# Patient Record
Sex: FEMALE | Race: WHITE | NOT HISPANIC OR LATINO | URBAN - METROPOLITAN AREA
[De-identification: names, ages, dates, MRNs, and addresses within clinical notes are randomized per-mention and may not be internally consistent; named-entity substitution may affect disease eponyms.]

---

## 2017-06-28 ENCOUNTER — EMERGENCY (EMERGENCY)
Facility: HOSPITAL | Age: 32
LOS: 1 days | Discharge: HOME | End: 2017-06-28
Attending: EMERGENCY MEDICINE
Payer: COMMERCIAL

## 2017-06-28 VITALS
DIASTOLIC BLOOD PRESSURE: 69 MMHG | RESPIRATION RATE: 20 BRPM | SYSTOLIC BLOOD PRESSURE: 102 MMHG | HEART RATE: 74 BPM | TEMPERATURE: 98 F | OXYGEN SATURATION: 99 %

## 2017-06-28 VITALS
SYSTOLIC BLOOD PRESSURE: 106 MMHG | HEART RATE: 69 BPM | TEMPERATURE: 98 F | OXYGEN SATURATION: 100 % | DIASTOLIC BLOOD PRESSURE: 71 MMHG | RESPIRATION RATE: 18 BRPM

## 2017-06-28 DIAGNOSIS — T42.4X2A POISONING BY BENZODIAZEPINES, INTENTIONAL SELF-HARM, INITIAL ENCOUNTER: ICD-10-CM

## 2017-06-28 DIAGNOSIS — F10.120 ALCOHOL ABUSE WITH INTOXICATION, UNCOMPLICATED: ICD-10-CM
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
## 2017-06-28 DIAGNOSIS — Z88.1 ALLERGY STATUS TO OTHER ANTIBIOTIC AGENTS STATUS: ICD-10-CM

## 2017-06-28 LAB
ALBUMIN SERPL ELPH-MCNC: 4 G/DL — SIGNIFICANT CHANGE UP (ref 3.5–5)
ALP SERPL-CCNC: 33 U/L — LOW (ref 40–120)
ALT FLD-CCNC: 16 U/L DA — SIGNIFICANT CHANGE UP (ref 10–60)
ANION GAP SERPL CALC-SCNC: 8 MMOL/L — SIGNIFICANT CHANGE UP (ref 5–17)
APAP SERPL-MCNC: <2 UG/ML — LOW (ref 10–30)
AST SERPL-CCNC: 16 U/L — SIGNIFICANT CHANGE UP (ref 10–40)
BASOPHILS # BLD AUTO: 0.1 K/UL — SIGNIFICANT CHANGE UP (ref 0–0.2)
BASOPHILS NFR BLD AUTO: 1.3 % — SIGNIFICANT CHANGE UP (ref 0–2)
BILIRUB SERPL-MCNC: 0.5 MG/DL — SIGNIFICANT CHANGE UP (ref 0.2–1.2)
BUN SERPL-MCNC: 8 MG/DL — SIGNIFICANT CHANGE UP (ref 7–18)
CALCIUM SERPL-MCNC: 8.5 MG/DL — SIGNIFICANT CHANGE UP (ref 8.4–10.5)
CHLORIDE SERPL-SCNC: 108 MMOL/L — SIGNIFICANT CHANGE UP (ref 96–108)
CO2 SERPL-SCNC: 26 MMOL/L — SIGNIFICANT CHANGE UP (ref 22–31)
CREAT SERPL-MCNC: 0.6 MG/DL — SIGNIFICANT CHANGE UP (ref 0.5–1.3)
EOSINOPHIL # BLD AUTO: 0.2 K/UL — SIGNIFICANT CHANGE UP (ref 0–0.5)
EOSINOPHIL NFR BLD AUTO: 4.7 % — SIGNIFICANT CHANGE UP (ref 0–6)
ETHANOL SERPL-MCNC: 139 MG/DL — HIGH (ref 0–10)
GLUCOSE SERPL-MCNC: 88 MG/DL — SIGNIFICANT CHANGE UP (ref 70–99)
HCG SERPL-ACNC: <1 MIU/ML — SIGNIFICANT CHANGE UP
HCT VFR BLD CALC: 36.6 % — SIGNIFICANT CHANGE UP (ref 34.5–45)
HGB BLD-MCNC: 11.9 G/DL — SIGNIFICANT CHANGE UP (ref 11.5–15.5)
LYMPHOCYTES # BLD AUTO: 2.3 K/UL — SIGNIFICANT CHANGE UP (ref 1–3.3)
LYMPHOCYTES # BLD AUTO: 43.6 % — SIGNIFICANT CHANGE UP (ref 13–44)
MCHC RBC-ENTMCNC: 29.5 PG — SIGNIFICANT CHANGE UP (ref 27–34)
MCHC RBC-ENTMCNC: 32.6 GM/DL — SIGNIFICANT CHANGE UP (ref 32–36)
MCV RBC AUTO: 90.5 FL — SIGNIFICANT CHANGE UP (ref 80–100)
MONOCYTES # BLD AUTO: 0.5 K/UL — SIGNIFICANT CHANGE UP (ref 0–0.9)
MONOCYTES NFR BLD AUTO: 9 % — SIGNIFICANT CHANGE UP (ref 2–14)
NEUTROPHILS # BLD AUTO: 2.1 K/UL — SIGNIFICANT CHANGE UP (ref 1.8–7.4)
NEUTROPHILS NFR BLD AUTO: 41.3 % — LOW (ref 43–77)
PLATELET # BLD AUTO: 228 K/UL — SIGNIFICANT CHANGE UP (ref 150–400)
POTASSIUM SERPL-MCNC: 3.4 MMOL/L — LOW (ref 3.5–5.3)
POTASSIUM SERPL-SCNC: 3.4 MMOL/L — LOW (ref 3.5–5.3)
PROT SERPL-MCNC: 7.8 G/DL — SIGNIFICANT CHANGE UP (ref 6–8.3)
RBC # BLD: 4.05 M/UL — SIGNIFICANT CHANGE UP (ref 3.8–5.2)
RBC # FLD: 13.7 % — SIGNIFICANT CHANGE UP (ref 10.3–14.5)
SALICYLATES SERPL-MCNC: <1.7 MG/DL — LOW (ref 2.8–20)
SODIUM SERPL-SCNC: 142 MMOL/L — SIGNIFICANT CHANGE UP (ref 135–145)
WBC # BLD: 5.2 K/UL — SIGNIFICANT CHANGE UP (ref 3.8–10.5)
WBC # FLD AUTO: 5.2 K/UL — SIGNIFICANT CHANGE UP (ref 3.8–10.5)

## 2017-06-28 PROCEDURE — 99284 EMERGENCY DEPT VISIT MOD MDM: CPT | Mod: 25

## 2017-06-28 PROCEDURE — 36416 COLLJ CAPILLARY BLOOD SPEC: CPT

## 2017-06-28 PROCEDURE — 85027 COMPLETE CBC AUTOMATED: CPT

## 2017-06-28 PROCEDURE — 96374 THER/PROPH/DIAG INJ IV PUSH: CPT

## 2017-06-28 PROCEDURE — 93005 ELECTROCARDIOGRAM TRACING: CPT

## 2017-06-28 PROCEDURE — 80053 COMPREHEN METABOLIC PANEL: CPT

## 2017-06-28 PROCEDURE — 84702 CHORIONIC GONADOTROPIN TEST: CPT

## 2017-06-28 PROCEDURE — 99285 EMERGENCY DEPT VISIT HI MDM: CPT | Mod: 25

## 2017-06-28 PROCEDURE — 80307 DRUG TEST PRSMV CHEM ANLYZR: CPT

## 2017-06-28 RX ORDER — DEXTROSE 50 % IN WATER 50 %
50 SYRINGE (ML) INTRAVENOUS ONCE
Qty: 0 | Refills: 0 | Status: COMPLETED | OUTPATIENT
Start: 2017-06-28 | End: 2017-06-28

## 2017-06-28 RX ORDER — SODIUM CHLORIDE 9 MG/ML
1000 INJECTION INTRAMUSCULAR; INTRAVENOUS; SUBCUTANEOUS ONCE
Qty: 0 | Refills: 0 | Status: COMPLETED | OUTPATIENT
Start: 2017-06-28 | End: 2017-06-28

## 2017-06-28 RX ADMIN — SODIUM CHLORIDE 1000 MILLILITER(S): 9 INJECTION INTRAMUSCULAR; INTRAVENOUS; SUBCUTANEOUS at 04:19

## 2017-06-28 RX ADMIN — Medication 50 MILLILITER(S): at 05:37

## 2017-06-28 NOTE — ED ADULT NURSE NOTE - CAS EDN DISCHARGE ASSESSMENT
Awake/Alert and oriented to person, place and time/Symptoms improved/No adverse reaction to first time med in ED

## 2017-06-28 NOTE — ED PROVIDER NOTE - MEDICAL DECISION MAKING DETAILS
33yo F w depression, in the ER after drinking alcohol and taking 10-15 klonipins (0.5mg). VS, exam wnl, EKG w sinus mer. Will get Psych clearance/consult and Tox

## 2017-06-28 NOTE — ED PROVIDER NOTE - PROGRESS NOTE DETAILS
D/w tox, Dr Taylor, recommend 6 hrs observation, close monitoring. If AAOx3, will be medically cleared for disposition. Will contact Psych Spoke w tele Psych, deferred evaluation till 9am given benzo and EtOH ingestion. Will call back reexamined, feels better, eating crackers. Will wait Psych consult at 9am Pt signed out to me by Dr. Cardoso as klonopin OD for unknown reasons. Initially difficult to evaluate. She is now A,A,Ox3, ate breakfast, and requesting d/c home. She denies severe depression or suicidal ideation at any point recently. She reports drinking too much EtOH last night a/w her birthday, then taking multiple doses of klonopin in attempts to sleep, not realizing she was taking too much because she was drunk. She contracts for safety, has good f/u with psych this week. D/w her dad who agrees she has been acting normally, no signs/sxs of severe depression/suicidality recently. Both feel comfortable going home and request immediate d/c. I offered psychiatric consultation here, but she refused, preferring her usual psychiatrist this week. Will cancel prior psych consult placed while patient was inebriated.

## 2017-06-28 NOTE — ED PROVIDER NOTE - OBJECTIVE STATEMENT
31yo F w hx of depression, evaluated by a psychologist, on Prozac and Klonipin, in the ER after drinking alcohol last night and taking 10-15 o.5 mg Klonipin pills in a suicidal attempt. Denies hallucinations and drug use. States she was admitted to a psych [facility] 2 years ago.

## 2017-06-28 NOTE — ED ADULT NURSE NOTE - OBJECTIVE STATEMENT
pt BIBA for overdose on pills and etoh intox pt place in yellow gown,red socks and  will be offered by time for d/c. PT admits to wanting to die and having swings of mood. pt place in yellow gown,red socks with roam alert and  will be offered by time for d/c

## 2017-06-28 NOTE — ED PROVIDER NOTE - CARE PLAN
Principal Discharge DX:	Alcoholic intoxication without complication  Secondary Diagnosis:	Suicide attempt

## 2017-09-19 ENCOUNTER — EMERGENCY (EMERGENCY)
Facility: HOSPITAL | Age: 32
LOS: 1 days | Discharge: ANOTHER HOSPITAL | End: 2017-09-19
Attending: EMERGENCY MEDICINE
Payer: COMMERCIAL

## 2017-09-19 ENCOUNTER — INPATIENT (INPATIENT)
Facility: HOSPITAL | Age: 32
LOS: 4 days | Discharge: HOME | DRG: 885 | End: 2017-09-24
Attending: PSYCHIATRY & NEUROLOGY | Admitting: PSYCHIATRY & NEUROLOGY
Payer: COMMERCIAL

## 2017-09-19 VITALS
RESPIRATION RATE: 18 BRPM | TEMPERATURE: 100 F | SYSTOLIC BLOOD PRESSURE: 106 MMHG | HEART RATE: 66 BPM | DIASTOLIC BLOOD PRESSURE: 55 MMHG

## 2017-09-19 VITALS
RESPIRATION RATE: 16 BRPM | TEMPERATURE: 98 F | WEIGHT: 160.06 LBS | SYSTOLIC BLOOD PRESSURE: 100 MMHG | HEIGHT: 66 IN | HEART RATE: 62 BPM | OXYGEN SATURATION: 100 % | DIASTOLIC BLOOD PRESSURE: 65 MMHG

## 2017-09-19 DIAGNOSIS — S51.811A LACERATION WITHOUT FOREIGN BODY OF RIGHT FOREARM, INITIAL ENCOUNTER: ICD-10-CM
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
## 2017-09-19 DIAGNOSIS — F33.0 MAJOR DEPRESSIVE DISORDER, RECURRENT, MILD: ICD-10-CM

## 2017-09-19 DIAGNOSIS — Z88.1 ALLERGY STATUS TO OTHER ANTIBIOTIC AGENTS STATUS: ICD-10-CM

## 2017-09-19 DIAGNOSIS — F31.30 BIPOLAR DISORDER, CURRENT EPISODE DEPRESSED, MILD OR MODERATE SEVERITY, UNSPECIFIED: ICD-10-CM

## 2017-09-19 DIAGNOSIS — S51.812A LACERATION WITHOUT FOREIGN BODY OF LEFT FOREARM, INITIAL ENCOUNTER: ICD-10-CM

## 2017-09-19 DIAGNOSIS — F10.10 ALCOHOL ABUSE, UNCOMPLICATED: ICD-10-CM
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
## 2017-09-19 LAB
ALBUMIN SERPL ELPH-MCNC: 3.6 G/DL — SIGNIFICANT CHANGE UP (ref 3.5–5)
ALP SERPL-CCNC: 28 U/L — LOW (ref 40–120)
ALT FLD-CCNC: 47 U/L DA — SIGNIFICANT CHANGE UP (ref 10–60)
ANION GAP SERPL CALC-SCNC: 3 MMOL/L — LOW (ref 5–17)
ANION GAP SERPL CALC-SCNC: 7 MMOL/L — SIGNIFICANT CHANGE UP (ref 5–17)
APAP SERPL-MCNC: <2 UG/ML — LOW (ref 10–30)
APPEARANCE UR: CLEAR — SIGNIFICANT CHANGE UP
APTT BLD: 27.1 SEC — LOW (ref 27.5–37.4)
AST SERPL-CCNC: 41 U/L — HIGH (ref 10–40)
BILIRUB SERPL-MCNC: 0.7 MG/DL — SIGNIFICANT CHANGE UP (ref 0.2–1.2)
BILIRUB UR-MCNC: NEGATIVE — SIGNIFICANT CHANGE UP
BUN SERPL-MCNC: 10 MG/DL — SIGNIFICANT CHANGE UP (ref 7–18)
BUN SERPL-MCNC: 10 MG/DL — SIGNIFICANT CHANGE UP (ref 7–18)
CALCIUM SERPL-MCNC: 7.6 MG/DL — LOW (ref 8.4–10.5)
CALCIUM SERPL-MCNC: 8.7 MG/DL — SIGNIFICANT CHANGE UP (ref 8.4–10.5)
CHLORIDE SERPL-SCNC: 109 MMOL/L — HIGH (ref 96–108)
CHLORIDE SERPL-SCNC: 112 MMOL/L — HIGH (ref 96–108)
CO2 SERPL-SCNC: 27 MMOL/L — SIGNIFICANT CHANGE UP (ref 22–31)
CO2 SERPL-SCNC: 27 MMOL/L — SIGNIFICANT CHANGE UP (ref 22–31)
COLOR SPEC: YELLOW — SIGNIFICANT CHANGE UP
CREAT SERPL-MCNC: 0.7 MG/DL — SIGNIFICANT CHANGE UP (ref 0.5–1.3)
CREAT SERPL-MCNC: 0.72 MG/DL — SIGNIFICANT CHANGE UP (ref 0.5–1.3)
DIFF PNL FLD: NEGATIVE — SIGNIFICANT CHANGE UP
ETHANOL SERPL-MCNC: 41 MG/DL — HIGH (ref 0–10)
GLUCOSE SERPL-MCNC: 64 MG/DL — LOW (ref 70–99)
GLUCOSE SERPL-MCNC: 75 MG/DL — SIGNIFICANT CHANGE UP (ref 70–99)
GLUCOSE UR QL: NEGATIVE — SIGNIFICANT CHANGE UP
HCG SERPL-ACNC: <1 MIU/ML — SIGNIFICANT CHANGE UP
HCT VFR BLD CALC: 33.9 % — LOW (ref 34.5–45)
HGB BLD-MCNC: 10.8 G/DL — LOW (ref 11.5–15.5)
INR BLD: 1.03 RATIO — SIGNIFICANT CHANGE UP (ref 0.88–1.16)
KETONES UR-MCNC: NEGATIVE — SIGNIFICANT CHANGE UP
LEUKOCYTE ESTERASE UR-ACNC: NEGATIVE — SIGNIFICANT CHANGE UP
MCHC RBC-ENTMCNC: 29 PG — SIGNIFICANT CHANGE UP (ref 27–34)
MCHC RBC-ENTMCNC: 31.9 GM/DL — LOW (ref 32–36)
MCV RBC AUTO: 90.9 FL — SIGNIFICANT CHANGE UP (ref 80–100)
NITRITE UR-MCNC: NEGATIVE — SIGNIFICANT CHANGE UP
PCP SPEC-MCNC: SIGNIFICANT CHANGE UP
PH UR: 6 — SIGNIFICANT CHANGE UP (ref 5–8)
PLATELET # BLD AUTO: 229 K/UL — SIGNIFICANT CHANGE UP (ref 150–400)
POTASSIUM SERPL-MCNC: 2.9 MMOL/L — CRITICAL LOW (ref 3.5–5.3)
POTASSIUM SERPL-MCNC: 4 MMOL/L — SIGNIFICANT CHANGE UP (ref 3.5–5.3)
POTASSIUM SERPL-SCNC: 2.9 MMOL/L — CRITICAL LOW (ref 3.5–5.3)
POTASSIUM SERPL-SCNC: 4 MMOL/L — SIGNIFICANT CHANGE UP (ref 3.5–5.3)
PROT SERPL-MCNC: 7.3 G/DL — SIGNIFICANT CHANGE UP (ref 6–8.3)
PROT UR-MCNC: NEGATIVE — SIGNIFICANT CHANGE UP
PROTHROM AB SERPL-ACNC: 11.2 SEC — SIGNIFICANT CHANGE UP (ref 9.8–12.7)
RBC # BLD: 3.73 M/UL — LOW (ref 3.8–5.2)
RBC # FLD: 13.2 % — SIGNIFICANT CHANGE UP (ref 10.3–14.5)
SALICYLATES SERPL-MCNC: <1.7 MG/DL — LOW (ref 2.8–20)
SODIUM SERPL-SCNC: 142 MMOL/L — SIGNIFICANT CHANGE UP (ref 135–145)
SODIUM SERPL-SCNC: 143 MMOL/L — SIGNIFICANT CHANGE UP (ref 135–145)
SP GR SPEC: 1.01 — SIGNIFICANT CHANGE UP (ref 1.01–1.02)
UROBILINOGEN FLD QL: NEGATIVE — SIGNIFICANT CHANGE UP
WBC # BLD: 4.2 K/UL — SIGNIFICANT CHANGE UP (ref 3.8–10.5)
WBC # FLD AUTO: 4.2 K/UL — SIGNIFICANT CHANGE UP (ref 3.8–10.5)

## 2017-09-19 PROCEDURE — 85027 COMPLETE CBC AUTOMATED: CPT

## 2017-09-19 PROCEDURE — 80053 COMPREHEN METABOLIC PANEL: CPT

## 2017-09-19 PROCEDURE — 84702 CHORIONIC GONADOTROPIN TEST: CPT

## 2017-09-19 PROCEDURE — 80178 ASSAY OF LITHIUM: CPT

## 2017-09-19 PROCEDURE — 96376 TX/PRO/DX INJ SAME DRUG ADON: CPT

## 2017-09-19 PROCEDURE — 96374 THER/PROPH/DIAG INJ IV PUSH: CPT

## 2017-09-19 PROCEDURE — 85610 PROTHROMBIN TIME: CPT

## 2017-09-19 PROCEDURE — 99285 EMERGENCY DEPT VISIT HI MDM: CPT | Mod: 25

## 2017-09-19 PROCEDURE — 87086 URINE CULTURE/COLONY COUNT: CPT

## 2017-09-19 PROCEDURE — 85730 THROMBOPLASTIN TIME PARTIAL: CPT

## 2017-09-19 PROCEDURE — 90792 PSYCH DIAG EVAL W/MED SRVCS: CPT | Mod: GT

## 2017-09-19 PROCEDURE — 80307 DRUG TEST PRSMV CHEM ANLYZR: CPT

## 2017-09-19 PROCEDURE — 96372 THER/PROPH/DIAG INJ SC/IM: CPT | Mod: 59

## 2017-09-19 PROCEDURE — 80048 BASIC METABOLIC PNL TOTAL CA: CPT

## 2017-09-19 PROCEDURE — 81003 URINALYSIS AUTO W/O SCOPE: CPT

## 2017-09-19 RX ORDER — POTASSIUM CHLORIDE 20 MEQ
40 PACKET (EA) ORAL ONCE
Qty: 0 | Refills: 0 | Status: COMPLETED | OUTPATIENT
Start: 2017-09-19 | End: 2017-09-19

## 2017-09-19 RX ORDER — POTASSIUM CHLORIDE 20 MEQ
10 PACKET (EA) ORAL
Qty: 0 | Refills: 0 | Status: COMPLETED | OUTPATIENT
Start: 2017-09-19 | End: 2017-09-19

## 2017-09-19 RX ORDER — CLONAZEPAM 1 MG
0.5 TABLET ORAL EVERY 12 HOURS
Qty: 0 | Refills: 0 | Status: DISCONTINUED | OUTPATIENT
Start: 2017-09-19 | End: 2017-09-20

## 2017-09-19 RX ORDER — HALOPERIDOL DECANOATE 100 MG/ML
5 INJECTION INTRAMUSCULAR ONCE
Qty: 0 | Refills: 0 | Status: DISCONTINUED | OUTPATIENT
Start: 2017-09-19 | End: 2017-09-19

## 2017-09-19 RX ORDER — SODIUM CHLORIDE 9 MG/ML
1000 INJECTION INTRAMUSCULAR; INTRAVENOUS; SUBCUTANEOUS ONCE
Qty: 0 | Refills: 0 | Status: COMPLETED | OUTPATIENT
Start: 2017-09-19 | End: 2017-09-19

## 2017-09-19 RX ORDER — LITHIUM CARBONATE 300 MG/1
300 TABLET, EXTENDED RELEASE ORAL DAILY
Qty: 0 | Refills: 0 | Status: DISCONTINUED | OUTPATIENT
Start: 2017-09-19 | End: 2017-09-20

## 2017-09-19 RX ORDER — HALOPERIDOL DECANOATE 100 MG/ML
5 INJECTION INTRAMUSCULAR EVERY 6 HOURS
Qty: 0 | Refills: 0 | Status: DISCONTINUED | OUTPATIENT
Start: 2017-09-19 | End: 2017-09-24

## 2017-09-19 RX ORDER — MIDAZOLAM HYDROCHLORIDE 1 MG/ML
2 INJECTION, SOLUTION INTRAMUSCULAR; INTRAVENOUS ONCE
Qty: 0 | Refills: 0 | Status: DISCONTINUED | OUTPATIENT
Start: 2017-09-19 | End: 2017-09-19

## 2017-09-19 RX ORDER — DIPHENHYDRAMINE HCL 50 MG
50 CAPSULE ORAL EVERY 6 HOURS
Qty: 0 | Refills: 0 | Status: DISCONTINUED | OUTPATIENT
Start: 2017-09-19 | End: 2017-09-24

## 2017-09-19 RX ORDER — QUETIAPINE FUMARATE 200 MG/1
50 TABLET, FILM COATED ORAL AT BEDTIME
Qty: 0 | Refills: 0 | Status: DISCONTINUED | OUTPATIENT
Start: 2017-09-19 | End: 2017-09-20

## 2017-09-19 RX ADMIN — Medication 0.5 MILLIGRAM(S): at 23:08

## 2017-09-19 RX ADMIN — SODIUM CHLORIDE 1000 MILLILITER(S): 9 INJECTION INTRAMUSCULAR; INTRAVENOUS; SUBCUTANEOUS at 11:33

## 2017-09-19 RX ADMIN — Medication 100 MILLIEQUIVALENT(S): at 06:29

## 2017-09-19 RX ADMIN — Medication 100 MILLIEQUIVALENT(S): at 04:59

## 2017-09-19 RX ADMIN — MIDAZOLAM HYDROCHLORIDE 2 MILLIGRAM(S): 1 INJECTION, SOLUTION INTRAMUSCULAR; INTRAVENOUS at 17:40

## 2017-09-19 RX ADMIN — LITHIUM CARBONATE 300 MILLIGRAM(S): 300 TABLET, EXTENDED RELEASE ORAL at 23:15

## 2017-09-19 RX ADMIN — QUETIAPINE FUMARATE 50 MILLIGRAM(S): 200 TABLET, FILM COATED ORAL at 23:09

## 2017-09-19 RX ADMIN — Medication 40 MILLIEQUIVALENT(S): at 11:32

## 2017-09-19 NOTE — ED PROVIDER NOTE - PROGRESS NOTE DETAILS
Patient to be admitted to [facility] after telepsych consultation.  2PC paperwork done by Dr. Lewis and Dr. Lee, patient given copy of notice, became agitated and violent towards staff, given medication, awaiting transport. Patient to be admitted to [facility] after telepsych consultation will be involuntary due to risk of self harm/suicide.  2P paperwork done by Dr. Lewis and Dr. Lee, patient given copy of notice, became agitated and violent towards staff, several attempts made at deescalation without success, given medication to decrease anxiety and to ensure patient safety.  Currently awaiting transport. Patient clinically sober, admits to cutting herself yesterday but states she has been cutting "for years" has recent hospitalizations at [facility], states she has not been taking meds for bi-polar because of side effects.  Will obtain tele-psych consult.

## 2017-09-19 NOTE — ED ADULT NURSE NOTE - OBJECTIVE STATEMENT
BIBA for suicide attempt. Pt unable to provide any information as she is lethargic despite repeated attempts. PT's father at bedside but unable to provide any info as he says he just found out that she was in the hospital. unable to makle needs known with 1:1 in place for care and safety. will continue to monitor

## 2017-09-19 NOTE — ED BEHAVIORAL HEALTH ASSESSMENT NOTE - PROFESSIONAL COLLATERAL RELATIONSHIP
previous psychiatrist-states that he has no knowledge of her alcohol use. states that he has not seen her since May, before she was hospitalized at [address] [place] [address]. pt cancelled her appt with him today. states that he only prescribed adderall, klonopin and prozac. all other medications were from [address] [place] [address]. she cannot comment about how she has been doing recently.

## 2017-09-19 NOTE — ED BEHAVIORAL HEALTH ASSESSMENT NOTE - CURRENT MEDICATION
klonopin .5mg, seroquel 50mg klonopin .5mg, seroquel 50mg, klonopin .5mg BID-TID (prescribed as BID in ISTOP), seroquel 50mg, lithium 300mg, lamictal 25-50 but stopped taking it 2 days ago (unclear- states that she felt sick on it) and adderall 20mg which she also stopped 2 days ago

## 2017-09-19 NOTE — ED ADULT TRIAGE NOTE - CHIEF COMPLAINT QUOTE
c/o patient drink alcohol and took drugs,multiple self inflicting wounds to both wrists,verbalized wants to commit suicide,mom at bed side,put on one to one observation

## 2017-09-19 NOTE — ED BEHAVIORAL HEALTH ASSESSMENT NOTE - OTHER PAST PSYCHIATRIC HISTORY (INCLUDE DETAILS REGARDING ONSET, COURSE OF ILLNESS, INPATIENT/OUTPATIENT TREATMENT)
Patient reports a history of Bipolar disorder, ADHD, anxiety, 3 past hospitalizations most recently at [facility] July 2017, 2 past suicide attempts by overdose most recently July 2017.

## 2017-09-19 NOTE — ED BEHAVIORAL HEALTH ASSESSMENT NOTE - SUICIDAL BEHAVIOR DETAILS
although denies SI, pt presented with b/l wrist cutting, etoh and overdose on medication initially stating it was a suicide attempt

## 2017-09-19 NOTE — ED BEHAVIORAL HEALTH ASSESSMENT NOTE - HPI (INCLUDE ILLNESS QUALITY, SEVERITY, DURATION, TIMING, CONTEXT, MODIFYING FACTORS, ASSOCIATED SIGNS AND SYMPTOMS)
Patient is a 32 year old domiciled with a roommate unemployed single  female non-caregiver with a history of Bipolar disorder, ADHD, anxiety with 3 past hospitalizations most recently July 2017 at [address] [place] [address] for a suicide attempt by overdose, 2 past suicide attempts by overdose, no known violence/arrests, reports social Etoh use, denies withdrawal symptoms or seizures, past medical history of mitral valve prolapse brought in by EMS activated by mother for a reported suicide attempt by overdose of medication and Etoh.    Patient reports being at her baseline until she found out she was diagnosed Bipolar 2 months ago. Patient reports that this coupled with recent flu-like symptoms with "swollen lymph nodes" has made her very anxious and depressed over the past few days. Patient states yesterday she had 2 shots of rum around 4pm to "unwind" and later when she went to take her night time medications she mixed up her pillbox which has pills for each day of the week. States she told her mother this and since her mother is a "paranoid Rastafarian woman" her mother called EMS and stated it was a suicide attempt. Patient states she usually takes Klonopin .5mg, Lithium, and Seroquel 50mg at night, but is unsure how much she took. Patient denies it was a suicide attempt and denies feeling suicidal at this time3., unable to recall that she told hospital staff last night that it was an attempt. Patient states she was feeling suicidal earlier that day but refuses to clarify, states she hurt herself by cutting earlier that day but minimizes self-harm stating she has been cutting since her teens. Patient states she has attempted suicide twice in the past by overdose and was hospitalized 3 times most recently at [address] [place] [address] in July 2017 for an overdose. Patient denies substance abuse issues, states she is not an alcoholic, reports yesterday was her first drink after being sober for 45 days. Reports she quit drinking cold turkey, denies past substance abuse treatment, no withdrawal symptoms/seizures. Patient reports that she sees a psychiatrist Dr. Decker and receives no other services, reports she is compliant with treatment.  Patient denies any auditory/visual hallucinations, denies paranoia, no delusions, no paranoia, no homicidal ideation, no access to weapons. Of note during assessment patient is guarded and minimally cooperative often minimizing events and symptoms, refuses to provide additional collateral for psychiatrist and mother. Patient states she wants to return home to rest and see her dog at this time. 32 year old domiciled with a roommate unemployed single  female non-caregiver with a history of Bipolar disorder, ADHD, anxiety with 3 past hospitalizations most recently 2017 at [address] [place] [address] for a suicide attempt by overdose, 2 past suicide attempts by overdose, no known violence/arrests, reports social Etoh use, denies withdrawal symptoms or seizures, past medical history of mitral valve prolapse and iron deficiency anemia, brought in by EMS activated by mother for a reported suicide attempt by overdose of medication and Etoh.    Interview difficult to conduct because the pt is guarded, states that she is “fine” and minimizes symptoms.  As per documentation, the pt was seen at [facility] 2017 after ingesting 10-15 tabs of klonopin and drinking alcohol. Pt subsequently denied it was a suicide attempt and was not seen by psychiatry. The following month in July, the pt voluntarily admitted herself to [address] [place] [address] (3rd time) secondary to suicide attempt via OD. Pt was diagnosed with bipolar disorder and prescribed lithium, lamictal and Seroquel (in addition to klonopin and Adderall) however has not kept an appointment with her outpatient psychiatrist Dr. Decker since May (reportedly around the time her grandfather ). During ER triage today, it was noted that the pt stated that she overdosed on medication, cut her wrists and drank alcohol verbalizing it was a suicide attempt. 911 was called by mother, however mother has been unreachable for collateral. Pt was sedated when arriving to the ER- BAL was 139 several hours after arriving to the ER. Pt later refused to engage with ER staff- not stating what she took or why she took it.   During interview, the pt states that she has been doing “good” for some time.  After some time the pt then reports feeling depressed since being diagnosed with bipolar disorder 2 months ago. Pt stated that she thinks about harming herself daily, however has not engaged in SIB and also has not used any alcohol in 45 days because she was feeling too depressed and unmotivated. Pt states that her depression somewhat lifted over the past few days, however at the same time pt began to feel anxious about having flu-like symptoms with "swollen lymph nodes.” Pt states that she met with a vascular physician yesterday who did test for “URI, lithium and lamictal level.” After returning home, the pt  states that she was feeling suicidal earlier in the day and tried to harm herself but would not say why, “I plead the 5th.” She states that she only took  2 shots of rum around 4pm to "unwind” and later when she went to take her night time medications she mixed up her pillbox which has pills for each day of the week.  She cannot say which medications she took or how many she took. States she told her mother this, and since her mother is a "paranoid Syn[employer] woman" her mother called EMS and stated it was a suicide attempt. Mother could not be reached and pt stated that it was not necessary to call mother because neither of her parents know her very well. When asked about collateral from her roommate of 4 years, the pt absolutely refused. Writer stated that it would be helpful for someone to get medication bottles to count remaining pills, however pt became angry stating that will not happen.   Patient denies substance abuse issues, states she is not an alcoholic, reports yesterday was her first drink after being sober for 45 days. Reports she quit drinking cold turkey, denies past substance abuse treatment, no withdrawal symptoms/seizures. Patient reports that she sees a psychiatrist Dr. Decker and receives no other services, reports she is compliant with treatment.  Despite this, when asked about collateral, the pt states that she no longer has a relationship with Dr. Decker and has an appointment with a substance abuse specialist, Dr. Balderas coming up. Pt denies any other substance use other than alcohol  Patient denies any auditory/visual hallucinations, denies paranoia, no delusions, no paranoia, no homicidal ideation, no access to weapons.

## 2017-09-19 NOTE — ED BEHAVIORAL HEALTH ASSESSMENT NOTE - DESCRIPTION
Patient arrived intoxicated, overall calm and cooperative, slept most of the night, no PRN psychiatric medications required. Mitral valve prolapse Patient resides with a roommate, is currently unemployed, never , no children. Patient arrived intoxicated, overall calm and but refusing to engage in interview, slept most of the night, no PRN psychiatric medications required. Mitral valve prolapse, iron deficiency amemia Patient resides with a roommate, is currently unemployed, never , no children. 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 states that she has been unable to find employment in her field

## 2017-09-19 NOTE — ED PROVIDER NOTE - OBJECTIVE STATEMENT
33 y/o F, w/ history of previous suicide attempt by overdosing Klonopin and alcohol, isph BIB EMS to ED w/ suicide attempt. Pt is sleepy and intoxicated in ED, but arousable; pt states her mother called EMS but will not provide other information. Will re-assess when the weather is better NKDA. 31 y/o F, w/ history of previous suicide attempt by overdosing Klonopin and alcohol, BIB EMS to ED w/ suicide attempt. Pt is sleepy and intoxicated in ED, but arousable; pt states her mother called EMS but will not provide other information about what she did or why she did it. Will re-assess when the pt is more sober.  NKDA. 31 y/o F, w/ history of previous suicide attempt by overdosing Klonopin and alcohol, BIB EMS to ED w/ suicide attempt. Pt is sleepy and intoxicated in ED, but arousable; pt states her mother called EMS but will not provide other information about what she did or why she did it. Multiple superficial/ non suturable cuts to b/l flexor forearms noted.  Called mpthers phone number listed in chart but "off the hook".   Will re-assess when the pt is more sober.  NKDA.

## 2017-09-19 NOTE — ED ADULT NURSE REASSESSMENT NOTE - NS ED NURSE REASSESS COMMENT FT1
Pt sitting on bed conversing with father, endorsed to Bro CAUSEY. Pt 2 PC to psychiatric care to [facility]. Awaiting arrival of transfer team.

## 2017-09-19 NOTE — ED BEHAVIORAL HEALTH ASSESSMENT NOTE - SUMMARY
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 Pt was last seen in our ER on 6/2017 with similar presentation as today (overdosing medication along with alcohol) and then hospitalized at [facility] the following month for a suicide attempt. Today the pt was bought in by EMS called by her mother for reported SA of overdosing on medications, drinking (BAL 2 hours after coming to ER was 139) and cutting wrists b/l. On admission it was documented that it was a verbalized suicide attempt, however pt later told clinicians that she was refusing to say what she overdosed on or why she did so, and now after clinically sober pt is denying SA and states that she “got confused” with her medications. On exam pt is extremely guarded and irritable. Although she denies SI at current time, she did report having thoughts of wanting to hurt herself yesterday prior to drinking as well as “always” having these thoughts. Pt states that she did not engage in SIB or drinking for the past 45 days because she was feeling too depressed, however depression somewhat lifted last week. Pt refused to answer questions about symptoms directly and then contradicts herself- stating that she is feeling “great”   later stating that she had been depressed for several weeks, was not leaving her house; stating that she does not have a h/o alcohol abuse but later states that she is transferring her psychiatric care to psychiatrist specializing in substance abuse tx. Pt unwilling to provide additional collateral, including having someone (parent or roommate) access her pills in her home to count how many she took.  Pt is angrily demanding to go home, however is at high risk of self-harm given multiple repeated overdoses in short amount of time.

## 2017-09-19 NOTE — ED BEHAVIORAL HEALTH ASSESSMENT NOTE - SUICIDE RISK FACTORS
Highly impulsive behavior/Substance abuse/dependence/Mood episode/Unable to engage in safety planning/Chronic pain or acute medical issue/Access to means (pills, firearms, etc.)

## 2017-09-19 NOTE — ED BEHAVIORAL HEALTH ASSESSMENT NOTE - PERSONAL COLLATERAL RELATIONSHIP
father- states that he does not live with the pt. he only knows that recently pt was c/o "circulation problems" and stated didn't take her medication for the past 2 days (over the weekend)

## 2017-09-19 NOTE — ED BEHAVIORAL HEALTH ASSESSMENT NOTE - DETAILS
Patient attempted suicide by overdose in July requiring hospitalization at [address] [place] [address], [facility] she has been cutting since teens and cut yesterday States lamictal made her gain weight could not get a hold of mother Dr. Kang. will restart home medications. pt does not wish to take adderall and lamictal so can hold until discuss with inpatient team.

## 2017-09-19 NOTE — ED BEHAVIORAL HEALTH ASSESSMENT NOTE - MEDICAL ISSUES AND PLAN (INCLUDE STANDING AND PRN MEDICATION)
iron deficiency anemia- h/h lowered after second blood draw. beside several supplements, denies taking other medications

## 2017-09-19 NOTE — ED PROVIDER NOTE - CARE PLAN
Principal Discharge DX:	Suicidal behavior with attempted self-injury  Secondary Diagnosis:	Bipolar disorder, current episode depressed, mild or moderate severity, unspecified

## 2017-09-19 NOTE — ED BEHAVIORAL HEALTH ASSESSMENT NOTE - PSYCHIATRIC ISSUES AND PLAN (INCLUDE STANDING AND PRN MEDICATION)
unclear how many pills took in overdose seroquel 50mg HS, klonopin 0.5mg BID, lithium 300mg qAM. hold other meds as stated above. for agitation can given haldol 5mg IM and ativan 2mg IM. only reported allergies to medications as stated above.

## 2017-09-19 NOTE — ED BEHAVIORAL HEALTH ASSESSMENT NOTE - NS ED BHA PLAN REASON FOR OVERRIDING OBJECTION FT
pt guarded, initially denies symptoms but then admits to feeling depressed and trying to harm herself. refuses to give collateral and then refused to speak with writer again to state that she was going to be admitted.

## 2017-09-19 NOTE — ED BEHAVIORAL HEALTH ASSESSMENT NOTE - OTHER
Mother Roommate Diagnosed Bipolar, father states has not been taking medication as prescribed unable to assess, patient supine in bed n/a telepsych ISTOP

## 2017-09-20 LAB
CULTURE RESULTS: NO GROWTH — SIGNIFICANT CHANGE UP
SPECIMEN SOURCE: SIGNIFICANT CHANGE UP

## 2017-09-20 PROCEDURE — 90792 PSYCH DIAG EVAL W/MED SRVCS: CPT

## 2017-09-20 RX ORDER — CLONAZEPAM 1 MG
0.5 TABLET ORAL
Qty: 0 | Refills: 0 | Status: DISCONTINUED | OUTPATIENT
Start: 2017-09-20 | End: 2017-09-24

## 2017-09-20 RX ORDER — LITHIUM CARBONATE 300 MG/1
300 TABLET, EXTENDED RELEASE ORAL AT BEDTIME
Qty: 0 | Refills: 0 | Status: DISCONTINUED | OUTPATIENT
Start: 2017-09-20 | End: 2017-09-21

## 2017-09-20 RX ORDER — CHLORPROMAZINE HCL 10 MG
50 TABLET ORAL AT BEDTIME
Qty: 0 | Refills: 0 | Status: DISCONTINUED | OUTPATIENT
Start: 2017-09-20 | End: 2017-09-21

## 2017-09-20 RX ORDER — TRAZODONE HCL 50 MG
50 TABLET ORAL AT BEDTIME
Qty: 0 | Refills: 0 | Status: DISCONTINUED | OUTPATIENT
Start: 2017-09-20 | End: 2017-09-20

## 2017-09-20 RX ORDER — ACETAMINOPHEN 500 MG
650 TABLET ORAL EVERY 6 HOURS
Qty: 0 | Refills: 0 | Status: DISCONTINUED | OUTPATIENT
Start: 2017-09-20 | End: 2017-09-24

## 2017-09-20 RX ORDER — VALACYCLOVIR 500 MG/1
1000 TABLET, FILM COATED ORAL DAILY
Qty: 0 | Refills: 0 | Status: DISCONTINUED | OUTPATIENT
Start: 2017-09-20 | End: 2017-09-24

## 2017-09-20 RX ORDER — DEXTROAMPHETAMINE SACCHARATE, AMPHETAMINE ASPARTATE, DEXTROAMPHETAMINE SULFATE AND AMPHETAMINE SULFATE 1.875; 1.875; 1.875; 1.875 MG/1; MG/1; MG/1; MG/1
10 TABLET ORAL
Qty: 0 | Refills: 0 | Status: DISCONTINUED | OUTPATIENT
Start: 2017-09-20 | End: 2017-09-20

## 2017-09-20 RX ORDER — DEXTROAMPHETAMINE SACCHARATE, AMPHETAMINE ASPARTATE, DEXTROAMPHETAMINE SULFATE AND AMPHETAMINE SULFATE 1.875; 1.875; 1.875; 1.875 MG/1; MG/1; MG/1; MG/1
20 TABLET ORAL DAILY
Qty: 0 | Refills: 0 | Status: DISCONTINUED | OUTPATIENT
Start: 2017-09-21 | End: 2017-09-24

## 2017-09-20 RX ORDER — DEXTROAMPHETAMINE SACCHARATE, AMPHETAMINE ASPARTATE, DEXTROAMPHETAMINE SULFATE AND AMPHETAMINE SULFATE 1.875; 1.875; 1.875; 1.875 MG/1; MG/1; MG/1; MG/1
10 TABLET ORAL
Qty: 0 | Refills: 0 | Status: DISCONTINUED | OUTPATIENT
Start: 2017-09-21 | End: 2017-09-24

## 2017-09-20 RX ORDER — FERROUS SULFATE 325(65) MG
325 TABLET ORAL DAILY
Qty: 0 | Refills: 0 | Status: DISCONTINUED | OUTPATIENT
Start: 2017-09-20 | End: 2017-09-24

## 2017-09-20 RX ORDER — LACTOBACILLUS ACIDOPHILUS 100MM CELL
1 CAPSULE ORAL DAILY
Qty: 0 | Refills: 0 | Status: DISCONTINUED | OUTPATIENT
Start: 2017-09-20 | End: 2017-09-24

## 2017-09-20 RX ORDER — DEXTROAMPHETAMINE SACCHARATE, AMPHETAMINE ASPARTATE, DEXTROAMPHETAMINE SULFATE AND AMPHETAMINE SULFATE 1.875; 1.875; 1.875; 1.875 MG/1; MG/1; MG/1; MG/1
20 TABLET ORAL ONCE
Qty: 0 | Refills: 0 | Status: DISCONTINUED | OUTPATIENT
Start: 2017-09-20 | End: 2017-09-20

## 2017-09-20 RX ADMIN — Medication 50 MILLIGRAM(S): at 21:42

## 2017-09-20 RX ADMIN — VALACYCLOVIR 1000 MILLIGRAM(S): 500 TABLET, FILM COATED ORAL at 13:03

## 2017-09-20 RX ADMIN — Medication 1 TABLET(S): at 13:03

## 2017-09-20 RX ADMIN — DEXTROAMPHETAMINE SACCHARATE, AMPHETAMINE ASPARTATE, DEXTROAMPHETAMINE SULFATE AND AMPHETAMINE SULFATE 20 MILLIGRAM(S): 1.875; 1.875; 1.875; 1.875 TABLET ORAL at 13:03

## 2017-09-20 RX ADMIN — LITHIUM CARBONATE 300 MILLIGRAM(S): 300 TABLET, EXTENDED RELEASE ORAL at 21:42

## 2017-09-20 RX ADMIN — Medication 0.5 MILLIGRAM(S): at 21:46

## 2017-09-20 RX ADMIN — Medication 1 APPLICATION(S): at 17:11

## 2017-09-20 RX ADMIN — Medication 1 TABLET(S): at 13:04

## 2017-09-20 RX ADMIN — DEXTROAMPHETAMINE SACCHARATE, AMPHETAMINE ASPARTATE, DEXTROAMPHETAMINE SULFATE AND AMPHETAMINE SULFATE 10 MILLIGRAM(S): 1.875; 1.875; 1.875; 1.875 TABLET ORAL at 17:10

## 2017-09-20 RX ADMIN — Medication 325 MILLIGRAM(S): at 13:04

## 2017-09-21 PROCEDURE — 99233 SBSQ HOSP IP/OBS HIGH 50: CPT

## 2017-09-21 RX ORDER — LITHIUM CARBONATE 300 MG/1
150 TABLET, EXTENDED RELEASE ORAL AT BEDTIME
Qty: 0 | Refills: 0 | Status: COMPLETED | OUTPATIENT
Start: 2017-09-21 | End: 2017-09-22

## 2017-09-21 RX ORDER — CHLORPROMAZINE HCL 10 MG
100 TABLET ORAL AT BEDTIME
Qty: 0 | Refills: 0 | Status: DISCONTINUED | OUTPATIENT
Start: 2017-09-21 | End: 2017-09-21

## 2017-09-21 RX ORDER — CHLORPROMAZINE HCL 10 MG
50 TABLET ORAL AT BEDTIME
Qty: 0 | Refills: 0 | Status: DISCONTINUED | OUTPATIENT
Start: 2017-09-21 | End: 2017-09-24

## 2017-09-21 RX ADMIN — DEXTROAMPHETAMINE SACCHARATE, AMPHETAMINE ASPARTATE, DEXTROAMPHETAMINE SULFATE AND AMPHETAMINE SULFATE 10 MILLIGRAM(S): 1.875; 1.875; 1.875; 1.875 TABLET ORAL at 13:52

## 2017-09-21 RX ADMIN — Medication 50 MILLIGRAM(S): at 20:48

## 2017-09-21 RX ADMIN — Medication 0.5 MILLIGRAM(S): at 21:55

## 2017-09-21 RX ADMIN — Medication 1 TABLET(S): at 08:22

## 2017-09-21 RX ADMIN — LITHIUM CARBONATE 150 MILLIGRAM(S): 300 TABLET, EXTENDED RELEASE ORAL at 20:48

## 2017-09-21 RX ADMIN — Medication 1 APPLICATION(S): at 20:47

## 2017-09-21 RX ADMIN — Medication 325 MILLIGRAM(S): at 08:23

## 2017-09-21 RX ADMIN — Medication 1 APPLICATION(S): at 08:23

## 2017-09-21 RX ADMIN — VALACYCLOVIR 1000 MILLIGRAM(S): 500 TABLET, FILM COATED ORAL at 08:22

## 2017-09-21 RX ADMIN — Medication 1 APPLICATION(S): at 09:50

## 2017-09-21 RX ADMIN — DEXTROAMPHETAMINE SACCHARATE, AMPHETAMINE ASPARTATE, DEXTROAMPHETAMINE SULFATE AND AMPHETAMINE SULFATE 20 MILLIGRAM(S): 1.875; 1.875; 1.875; 1.875 TABLET ORAL at 08:22

## 2017-09-22 PROCEDURE — 99358 PROLONG SERVICE W/O CONTACT: CPT

## 2017-09-22 PROCEDURE — 99233 SBSQ HOSP IP/OBS HIGH 50: CPT

## 2017-09-22 RX ORDER — LAMOTRIGINE 25 MG/1
1 TABLET, ORALLY DISINTEGRATING ORAL
Qty: 0 | Refills: 0 | COMMUNITY
Start: 2017-09-22

## 2017-09-22 RX ORDER — CHLORPROMAZINE HCL 10 MG
1 TABLET ORAL
Qty: 60 | Refills: 0 | OUTPATIENT
Start: 2017-09-22 | End: 2017-10-22

## 2017-09-22 RX ORDER — DEXTROAMPHETAMINE SACCHARATE, AMPHETAMINE ASPARTATE, DEXTROAMPHETAMINE SULFATE AND AMPHETAMINE SULFATE 1.875; 1.875; 1.875; 1.875 MG/1; MG/1; MG/1; MG/1
1 TABLET ORAL
Qty: 30 | Refills: 0 | OUTPATIENT
Start: 2017-09-22 | End: 2017-10-22

## 2017-09-22 RX ORDER — VALACYCLOVIR 500 MG/1
1 TABLET, FILM COATED ORAL
Qty: 0 | Refills: 0 | COMMUNITY
Start: 2017-09-22

## 2017-09-22 RX ORDER — LAMOTRIGINE 25 MG/1
1 TABLET, ORALLY DISINTEGRATING ORAL
Qty: 30 | Refills: 0 | OUTPATIENT
Start: 2017-09-22 | End: 2017-10-22

## 2017-09-22 RX ORDER — VALACYCLOVIR 500 MG/1
1 TABLET, FILM COATED ORAL
Qty: 30 | Refills: 0 | OUTPATIENT
Start: 2017-09-22 | End: 2017-10-22

## 2017-09-22 RX ORDER — DEXTROAMPHETAMINE SACCHARATE, AMPHETAMINE ASPARTATE, DEXTROAMPHETAMINE SULFATE AND AMPHETAMINE SULFATE 1.875; 1.875; 1.875; 1.875 MG/1; MG/1; MG/1; MG/1
1 TABLET ORAL
Qty: 0 | Refills: 0 | COMMUNITY
Start: 2017-09-22

## 2017-09-22 RX ORDER — LAMOTRIGINE 25 MG/1
12.5 TABLET, ORALLY DISINTEGRATING ORAL DAILY
Qty: 0 | Refills: 0 | Status: DISCONTINUED | OUTPATIENT
Start: 2017-09-23 | End: 2017-09-24

## 2017-09-22 RX ADMIN — DEXTROAMPHETAMINE SACCHARATE, AMPHETAMINE ASPARTATE, DEXTROAMPHETAMINE SULFATE AND AMPHETAMINE SULFATE 10 MILLIGRAM(S): 1.875; 1.875; 1.875; 1.875 TABLET ORAL at 13:31

## 2017-09-22 RX ADMIN — Medication 50 MILLIGRAM(S): at 20:52

## 2017-09-22 RX ADMIN — Medication 325 MILLIGRAM(S): at 08:35

## 2017-09-22 RX ADMIN — DEXTROAMPHETAMINE SACCHARATE, AMPHETAMINE ASPARTATE, DEXTROAMPHETAMINE SULFATE AND AMPHETAMINE SULFATE 20 MILLIGRAM(S): 1.875; 1.875; 1.875; 1.875 TABLET ORAL at 08:35

## 2017-09-22 RX ADMIN — VALACYCLOVIR 1000 MILLIGRAM(S): 500 TABLET, FILM COATED ORAL at 08:35

## 2017-09-22 RX ADMIN — Medication 0.5 MILLIGRAM(S): at 20:52

## 2017-09-22 RX ADMIN — Medication 1 TABLET(S): at 08:35

## 2017-09-22 RX ADMIN — Medication 1 APPLICATION(S): at 08:36

## 2017-09-22 RX ADMIN — Medication 1 APPLICATION(S): at 20:52

## 2017-09-22 RX ADMIN — LITHIUM CARBONATE 150 MILLIGRAM(S): 300 TABLET, EXTENDED RELEASE ORAL at 20:52

## 2017-09-23 PROCEDURE — 99239 HOSP IP/OBS DSCHRG MGMT >30: CPT

## 2017-09-23 RX ADMIN — Medication 0.5 MILLIGRAM(S): at 21:24

## 2017-09-23 RX ADMIN — Medication 50 MILLIGRAM(S): at 20:07

## 2017-09-23 RX ADMIN — Medication 1 TABLET(S): at 08:31

## 2017-09-23 RX ADMIN — Medication 50 MILLIGRAM(S): at 21:24

## 2017-09-23 RX ADMIN — Medication 1 APPLICATION(S): at 20:07

## 2017-09-23 RX ADMIN — DEXTROAMPHETAMINE SACCHARATE, AMPHETAMINE ASPARTATE, DEXTROAMPHETAMINE SULFATE AND AMPHETAMINE SULFATE 10 MILLIGRAM(S): 1.875; 1.875; 1.875; 1.875 TABLET ORAL at 12:14

## 2017-09-23 RX ADMIN — Medication 1 TABLET(S): at 08:32

## 2017-09-23 RX ADMIN — VALACYCLOVIR 1000 MILLIGRAM(S): 500 TABLET, FILM COATED ORAL at 08:32

## 2017-09-23 RX ADMIN — Medication 325 MILLIGRAM(S): at 08:32

## 2017-09-23 RX ADMIN — LAMOTRIGINE 12.5 MILLIGRAM(S): 25 TABLET, ORALLY DISINTEGRATING ORAL at 08:32

## 2017-09-23 RX ADMIN — DEXTROAMPHETAMINE SACCHARATE, AMPHETAMINE ASPARTATE, DEXTROAMPHETAMINE SULFATE AND AMPHETAMINE SULFATE 20 MILLIGRAM(S): 1.875; 1.875; 1.875; 1.875 TABLET ORAL at 08:31

## 2017-09-23 RX ADMIN — Medication 1 APPLICATION(S): at 08:32

## 2017-09-24 PROCEDURE — 90686 IIV4 VACC NO PRSV 0.5 ML IM: CPT

## 2017-09-24 RX ORDER — INFLUENZA VIRUS VACCINE 15; 15; 15; 15 UG/.5ML; UG/.5ML; UG/.5ML; UG/.5ML
0.5 SUSPENSION INTRAMUSCULAR ONCE
Qty: 0 | Refills: 0 | Status: COMPLETED | OUTPATIENT
Start: 2017-09-24 | End: 2017-09-24

## 2017-09-24 RX ADMIN — LAMOTRIGINE 12.5 MILLIGRAM(S): 25 TABLET, ORALLY DISINTEGRATING ORAL at 08:37

## 2017-09-24 RX ADMIN — Medication 1 APPLICATION(S): at 08:36

## 2017-09-24 RX ADMIN — Medication 325 MILLIGRAM(S): at 08:37

## 2017-09-24 RX ADMIN — DEXTROAMPHETAMINE SACCHARATE, AMPHETAMINE ASPARTATE, DEXTROAMPHETAMINE SULFATE AND AMPHETAMINE SULFATE 10 MILLIGRAM(S): 1.875; 1.875; 1.875; 1.875 TABLET ORAL at 12:18

## 2017-09-24 RX ADMIN — INFLUENZA VIRUS VACCINE 0.5 MILLILITER(S): 15; 15; 15; 15 SUSPENSION INTRAMUSCULAR at 10:34

## 2017-09-24 RX ADMIN — VALACYCLOVIR 1000 MILLIGRAM(S): 500 TABLET, FILM COATED ORAL at 08:37

## 2017-09-24 RX ADMIN — Medication 1 TABLET(S): at 08:37

## 2017-09-24 RX ADMIN — DEXTROAMPHETAMINE SACCHARATE, AMPHETAMINE ASPARTATE, DEXTROAMPHETAMINE SULFATE AND AMPHETAMINE SULFATE 20 MILLIGRAM(S): 1.875; 1.875; 1.875; 1.875 TABLET ORAL at 08:37

## 2017-09-25 NOTE — CHART NOTE - NSCHARTNOTEFT_GEN_A_CORE
PSYCHIATRY ATTENDING NOTE:   Important phone numbers for Patient which are currently not in her [facility] chart:    Patient's cell phone: [phone]  Mother Ana Luisa: [phone]

## 2018-02-05 NOTE — ED ADULT NURSE NOTE - FALL HARM RISK TYPE OF ASSESSMENT
2/5/2018 11:18 AM 
 
Ms. Ethan Mccallum [address] [facility] Dear Ethan Mccallum: 
 
Please find your most recent results below. Resulted Orders AMB POC URINALYSIS DIP STICK AUTO W/O MICRO Result Value Ref Range Color (UA POC) Dark Yellow Clarity (UA POC) Clear Glucose (UA POC) Negative Negative Bilirubin (UA POC) Negative Negative Ketones (UA POC) Trace Negative Specific gravity (UA POC) 1.025 1.001 - 1.035 Blood (UA POC) Negative Negative pH (UA POC) 6.0 4.6 - 8.0 Protein (UA POC) 2+ Negative Urobilinogen (UA POC) 0.2 mg/dL 0.2 - 1 Nitrites (UA POC) Negative Negative Leukocyte esterase (UA POC) Negative Negative [facility]ative [facility] [address] [facility]. [id number] METABOLIC PANEL, COMPREHENSIVE Result Value Ref Range Glucose 104 (H) 65 - 99 mg/dL BUN 25 (H) 6 - 24 mg/dL Creatinine 1.83 (H) 0.57 - 1.00 mg/dL GFR est non-AA 30 (L) >59 mL/min/1.73 GFR est AA 34 (L) >59 mL/min/1.73  
 BUN/Creatinine ratio 14 9 - 23 Sodium 142 134 - 144 mmol/L Potassium 4.9 3.5 - 5.2 mmol/L Chloride 105 96 - 106 mmol/L  
 CO2 25 18 - 29 mmol/L Calcium 9.3 8.7 - 10.2 mg/dL Protein, total 6.7 6.0 - 8.5 g/dL Albumin 4.0 3.5 - 5.5 g/dL GLOBULIN, TOTAL 2.7 1.5 - 4.5 g/dL A-G Ratio 1.5 1.2 - 2.2 Bilirubin, total <0.2 0.0 - 1.2 mg/dL Alk. phosphatase 97 39 - 117 IU/L  
 AST (SGOT) 14 0 - 40 IU/L  
 ALT (SGPT) 8 0 - 32 IU/L Narrative Performed at:  [address]  [id number] : Deirdre Vitale MD, Phone:  [phone] VITAMIN D, 25 HYDROXY Result Value Ref Range VITAMIN D, 25-HYDROXY 44.0 30.0 - 100.0 ng/mL    Comment:  
   Vitamin D deficiency has been defined by the [facility] practice guideline as a 
 level of serum 25-OH vitamin D less than 20 ng/mL (1,2). The Endocrine Society went on to further define vitamin D 
insufficiency as a level between 21 and 29 ng/mL (2). 1. IOM ([place] of Medicine). 2010. Dietary reference 
   intakes for calcium and D. [facility]: The 
   [employer]. 2. Cass MF, Willow NC, Velia GARIBAY, et al. 
   Evaluation, treatment, and prevention of vitamin D 
   deficiency: an Endocrine Society clinical practice 
   guideline. JCEM. 2011 Jul; 96(7):1911-30. Narrative Performed at:  [address]  [id number] : Johnathon Guevara MD, Phone:  [phone] LIPID PANEL Result Value Ref Range Cholesterol, total 146 100 - 199 mg/dL Triglyceride 162 (H) 0 - 149 mg/dL HDL Cholesterol 38 (L) >39 mg/dL VLDL, calculated 32 5 - 40 mg/dL LDL, calculated 76 0 - 99 mg/dL Narrative Performed at:  [address]  [id number] : Johnathon Guevara MD, Phone:  [phone] HEMOGLOBIN A1C WITH EAG Result Value Ref Range Hemoglobin A1c 5.8 (H) 4.8 - 5.6 % Comment:  
            Pre-diabetes: 5.7 - 6.4 Diabetes: >6.4 Glycemic control for adults with diabetes: <7.0 Estimated average glucose 120 mg/dL Narrative Performed at:  [address]  [id number] : Johnathon Guevara MD, Phone:  [phone] CKD REPORT Result Value Ref Range Interpretation Note Comment:  
   Supplemental report is available. Narrative Performed at:  [address] [place] [address]  [id number] : Bear Pantoja PhD, Phone:  [phone] DIABETES PATIENT EDUCATION Result Value Ref Range PDF Image Not applicable Narrative Performed at:  [address] [address] [place], [place]  [id number] : Faye Hooker PhD, Phone:  [phone] RECOMMENDATIONS: 
There is acute insufficiency Please call me if you have any questions: [phone] Sincerely, Linda Sen MD 
 Daily Assessment

## 2018-03-01 NOTE — ED ADULT NURSE NOTE - NS ED NURSE LEVEL OF CONSCIOUSNESS SPEECH
"Chief Complaint   Patient presents with     Diabetes     colonoscopy  due     Lipids     Hypertension       Initial /60 (BP Location: Left arm, Patient Position: Sitting, Cuff Size: Adult Large)  Pulse 59  Temp 97.2  F (36.2  C) (Tympanic)  Resp 14  Ht 5' 7\" (1.702 m)  Wt 221 lb (100.2 kg)  SpO2 96%  BMI 34.61 kg/m2 Estimated body mass index is 34.61 kg/(m^2) as calculated from the following:    Height as of this encounter: 5' 7\" (1.702 m).    Weight as of this encounter: 221 lb (100.2 kg).  Medication Reconciliation: complete     Carol Sheehan      "
Slurred speech

## 2018-04-10 NOTE — ED PROVIDER NOTE - CPE EDP RESP NORM
"1. Caller Name: Marcus                      Call Back Number: [phone] (home)     2. Message: Dad called in wanting to let you know Ritu is nowtaking 1 and a half mg of Buspar in the morning and 1 and a half in the afternoon. He would like an increased dose sent to the pharmacy in [facility]. Marcus also wanted to inform you Ritus panic attacks are getting a lot worse especially after she eats \"proper meals\". Marcus says she has been getting away with eating granola bars, chocolate and pure junk food. She often starts crying after eating proper meals so he just wanted to give you a heads up before her appt with you next week.     3. Patient approves office to leave a detailed voicemail/MyChart message: N\A    "
Father's comments noted.      I prescribed 1.5 tabs twice a day (90 tabs a month) on the script I sent to the pharmacy in February with 5 refills.  Dad can check with pharmacy to see if they are filling the right prescription.    
normal...

## 2018-04-20 NOTE — ED ADULT TRIAGE NOTE - RESPIRATORY RATE (BREATHS/MIN)
"Chief Complaint   Patient presents with     Physical     Panel Management     tdap, bmp, hiv screen        Initial /72 (BP Location: Left arm, Patient Position: Chair, Cuff Size: Adult Regular)  Pulse 72  Temp 98.1  F (36.7  C) (Oral)  Resp 16  Ht 5' 4.17\" (1.63 m)  Wt 136 lb (61.7 kg)  LMP 09/11/2013  BMI 23.22 kg/m2 Estimated body mass index is 23.22 kg/(m^2) as calculated from the following:    Height as of this encounter: 5' 4.17\" (1.63 m).    Weight as of this encounter: 136 lb (61.7 kg).  Medication Reconciliation: complete    "
18

## 2020-06-23 NOTE — ED BEHAVIORAL HEALTH ASSESSMENT NOTE - FAMILY HISTORY OF MEDICAL ILLNESS
Celexa       Last Written Prescription Date:  6/16/2020  Last Fill Quantity: 180,   # refills: 0  Last Office Visit: 1/20/2020  Future Office visit:           
None known

## 2020-06-30 NOTE — ED ADULT NURSE NOTE - PMH
Mitral valve prolapse hx of "atypical chest pain" in 2019, s/p cardiology workup, s/p echo, "noncardiac origin chest pain", musculoskeletal origin, copy of echo in chart, pt denies further episodes of chest pain, denies SOB, or palpitations

## 2022-11-21 PROBLEM — I34.1 NONRHEUMATIC MITRAL (VALVE) PROLAPSE: Chronic | Status: ACTIVE | Noted: 2017-06-28

## 2022-12-14 ENCOUNTER — APPOINTMENT (OUTPATIENT)
Dept: ORTHOPEDIC SURGERY | Facility: CLINIC | Age: 37
End: 2022-12-14

## 2022-12-19 PROBLEM — Z00.00 ENCOUNTER FOR PREVENTIVE HEALTH EXAMINATION: Status: ACTIVE | Noted: 2022-12-19

## 2022-12-21 ENCOUNTER — APPOINTMENT (OUTPATIENT)
Dept: ORTHOPEDIC SURGERY | Facility: CLINIC | Age: 37
End: 2022-12-21

## 2022-12-21 VITALS — RESPIRATION RATE: 16 BRPM | HEIGHT: 64 IN | BODY MASS INDEX: 20.49 KG/M2 | WEIGHT: 120 LBS

## 2022-12-21 DIAGNOSIS — Z82.61 FAMILY HISTORY OF ARTHRITIS: ICD-10-CM

## 2022-12-21 DIAGNOSIS — Z78.9 OTHER SPECIFIED HEALTH STATUS: ICD-10-CM

## 2022-12-21 DIAGNOSIS — M21.862 OTHER SPECIFIED ACQUIRED DEFORMITIES OF RIGHT LOWER LEG: ICD-10-CM

## 2022-12-21 DIAGNOSIS — M21.861 OTHER SPECIFIED ACQUIRED DEFORMITIES OF RIGHT LOWER LEG: ICD-10-CM

## 2022-12-21 DIAGNOSIS — M22.8X1 OTHER DISORDERS OF PATELLA, RIGHT KNEE: ICD-10-CM

## 2022-12-21 DIAGNOSIS — M22.8X2 OTHER DISORDERS OF PATELLA, LEFT KNEE: ICD-10-CM

## 2022-12-21 PROCEDURE — 73562 X-RAY EXAM OF KNEE 3: CPT | Mod: 50

## 2022-12-21 PROCEDURE — 99203 OFFICE O/P NEW LOW 30 MIN: CPT

## 2024-08-18 NOTE — ED ADULT NURSE NOTE - FACILITY NAME (1)
[Skin Lesions] : skin lesions [Arthralgias] : arthralgias [FreeTextEntry1] : 53 yo F with pmh of dermatomyositis, HTN, HLD, DM2, hypothyroidism presents fpr f/u of dermatomyositis.  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 violaceous rash on face, wt loss) 2. 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  -2 subsequent flares with tapering the dose of Prednisone to 7.5 mg-10 mg       - June 2014 with chills, fever, rash - repeated skin bx consistent with dermatomyositis        - March 2015 with arthritis, improved with slight increase steroids. - pt started on Imuran (10/2014) as a steroid sparing agent but stopped due to epigastric pain, nausea and vomiting . Methotrexate was not given due to fatty liver disease and mildly elevated AST and ALT as well as previously seen renal disease. - HCQ started June 2014 - has been on it intermittently - restarted HCQ 2/2017 - CellCept started on December 2014.  - restarted CellCept 9/15. dsDNA has been persistently elevated and no current symptoms - cellcept stopped as of 2/2017 due to ?intolerace vs quiscent disease - PFTs July 2015 stable  -lost to [facility] until 2/2023 where she was referred to us by Neprhology after having months of foamy urine and found to have UPCR of 3.0 in 12/2022 which decreased to 1.8 in 1/2023 -kidney biopsy 1/20/23 showed immune complex mediated disease with scattered very small mesangial and subepitherlial dense deposits and trace IgG, IgA, and C3. Global glomerulosclerosis in 67% of glomeruli, Segmental GS 10%. moderate tubular atrophy and IF. Mild arterial and arteriolar sclerosis. EM showed moderate and incomplete effacement of podocyte foot processes -CT chest 2/2023 with decreased opacities of b/l reticular GGOs, bronchiectasis w/o honeycombing  No tobacco. Social drinker. Denies other drugs Works as  From [facility]. Immigrated 1999  No family history of autoimmune diseases  Interval Hx 7-8/2023: -Since renal bx 1/2023, pt was on brief course of prednisone for 2 weeks, was trialed on cellcept  Endorses resolution of joint pain and chest pain. Denies SOB. Denies weakness/rash. Endorses compliance with myfortic 720 BID. Has been on prednisone 5mg x1 week. Pending authorization for Voclosporin. Went to PCP for b/l buttock pain worsening over the past 3 months. States she has b/l hyperpigmented lesions in b/l buttock area with US in the past showing findings c/w calcifications likely from dermatomyositis  Interval Hx 7/2024: Pt states that she has not followed up in a year due to feeling well generally overall. Has not been taking cellcept or myfortic. Pt recalls that cellcept caused her to have nausea and dizziness so she stopped it, and she can't recall taking myfortic. Has been taking plaquenil 200mg qd instead.  Has symptoms of pain on buttocks, found to have calcifications. Pt endorses pain if sitting for too long, and has noticed white fluid draining. Recent US showed worsening calcifications compared to CT prior. Pt also endorses b/l knee pain, especially when she gets up from a seated position. Feels knees "cracking". Denies muscle weakness, new rashes. Endorses alopecia (stable now, was prominent initially back in 2012), foamy urine.  8/2024: Pt states she is feeling well overall. Still has b/l knee pain when standing up from seated position (ongoing for 3-4 months). No new rashes, urine looks less foamy. Has seen nephrologist Dr. Garza in the mean time, started on Farxiga and has been taking it for a weel.   [Weight Loss] : no weight loss [Fever] : no fever [Chills] : no chills [Malar Facial Rash] : no malar facial rash [Oral Ulcers] : no oral ulcers [Cough] : no cough [Dry Mouth] : no dry mouth [Dysphagia] : no dysphagia [Shortness of Breath] : no shortness of breath [Chest Pain] : no chest pain [Dyspnea] : no dyspnea [Myalgias] : no myalgias [Muscle Weakness] : no muscle weakness [Eye Pain] : no eye pain [Eye Redness] : no eye redness [Dry Eyes] : no dry eyes [place]

## 2025-03-31 NOTE — ED BEHAVIORAL HEALTH ASSESSMENT NOTE - NS ED B[facility] DEMOGRAPHICS MEDICAL RECORD REVIEWED YES RECORDS
To ER if acutely worse Follow up with your PCP in 3 - 4 days if worsening or not improving:    Carolyn Nguyen, CNP  [address] [place] [address] / [place] [address] [phone]    Hospital chart Hospital chart/Other